# Patient Record
Sex: FEMALE | Race: BLACK OR AFRICAN AMERICAN | Employment: FULL TIME | URBAN - METROPOLITAN AREA
[De-identification: names, ages, dates, MRNs, and addresses within clinical notes are randomized per-mention and may not be internally consistent; named-entity substitution may affect disease eponyms.]

---

## 2018-02-20 LAB
HEPATITIS B SURFACE ANTIGEN INTERPRETATION: NEGATIVE
HIV-1 AND HIV-2 ANTIBODIES: NON REACTIVE
RPR: NEGATIVE
RUBELLA ANTIBODY IGG: NORMAL

## 2018-09-21 ENCOUNTER — HOSPITAL ENCOUNTER (INPATIENT)
Dept: POSTPARTUM | Age: 33
LOS: 2 days | Discharge: HOME | End: 2018-09-23
Attending: OBSTETRICS & GYNECOLOGY | Admitting: OBSTETRICS & GYNECOLOGY
Payer: COMMERCIAL

## 2018-09-21 PROBLEM — Z34.90 ENCOUNTER FOR SUPERVISION OF NORMAL PREGNANCY: Status: ACTIVE | Noted: 2018-09-21

## 2018-09-21 LAB
A/G RATIO: 1.3 (ref 1.1–2.2)
ABO/RH: NORMAL
ALBUMIN SERPL-MCNC: 3.9 G/DL (ref 3.4–5)
ALP BLD-CCNC: 148 U/L (ref 40–129)
ALT SERPL-CCNC: 7 U/L (ref 10–40)
AMPHETAMINE SCREEN, URINE: NORMAL
ANION GAP SERPL CALCULATED.3IONS-SCNC: 12 MMOL/L (ref 3–16)
ANTIBODY SCREEN: NORMAL
AST SERPL-CCNC: 17 U/L (ref 15–37)
BACTERIA: ABNORMAL /HPF
BARBITURATE SCREEN URINE: NORMAL
BENZODIAZEPINE SCREEN, URINE: NORMAL
BILIRUB SERPL-MCNC: <0.2 MG/DL (ref 0–1)
BILIRUBIN URINE: NEGATIVE
BLOOD, URINE: ABNORMAL
BUN BLDV-MCNC: 9 MG/DL (ref 7–20)
BUPRENORPHINE URINE: NORMAL
CALCIUM SERPL-MCNC: 9.5 MG/DL (ref 8.3–10.6)
CANNABINOID SCREEN URINE: NORMAL
CHLORIDE BLD-SCNC: 104 MMOL/L (ref 99–110)
CLARITY: CLEAR
CO2: 18 MMOL/L (ref 21–32)
COCAINE METABOLITE SCREEN URINE: NORMAL
COLOR: YELLOW
CREAT SERPL-MCNC: 0.6 MG/DL (ref 0.6–1.1)
CREATININE URINE: 100.6 MG/DL (ref 28–259)
EPITHELIAL CELLS, UA: 3 /HPF (ref 0–5)
GFR AFRICAN AMERICAN: >60
GFR NON-AFRICAN AMERICAN: >60
GLOBULIN: 3.1 G/DL
GLUCOSE BLD-MCNC: 70 MG/DL (ref 70–99)
GLUCOSE BLD-MCNC: 74 MG/DL (ref 70–99)
GLUCOSE URINE: NEGATIVE MG/DL
HCT VFR BLD CALC: 34.1 % (ref 36–48)
HEMOGLOBIN: 10.9 G/DL (ref 12–16)
HYALINE CASTS: 3 /LPF (ref 0–8)
KETONES, URINE: NEGATIVE MG/DL
LEUKOCYTE ESTERASE, URINE: NEGATIVE
Lab: NORMAL
MCH RBC QN AUTO: 27 PG (ref 26–34)
MCHC RBC AUTO-ENTMCNC: 32.1 G/DL (ref 31–36)
MCV RBC AUTO: 84.1 FL (ref 80–100)
METHADONE SCREEN, URINE: NORMAL
MICROSCOPIC EXAMINATION: YES
NITRITE, URINE: NEGATIVE
OPIATE SCREEN URINE: NORMAL
OXYCODONE URINE: NORMAL
PDW BLD-RTO: 14.9 % (ref 12.4–15.4)
PERFORMED ON: NORMAL
PH UA: 6
PH UA: 6
PHENCYCLIDINE SCREEN URINE: NORMAL
PLATELET # BLD: 169 K/UL (ref 135–450)
PMV BLD AUTO: 11.2 FL (ref 5–10.5)
POTASSIUM SERPL-SCNC: 3.9 MMOL/L (ref 3.5–5.1)
PROPOXYPHENE SCREEN: NORMAL
PROTEIN PROTEIN: 22 MG/DL
PROTEIN UA: NEGATIVE MG/DL
PROTEIN/CREAT RATIO: 0.2 MG/DL
RBC # BLD: 4.05 M/UL (ref 4–5.2)
RBC UA: 5 /HPF (ref 0–4)
SODIUM BLD-SCNC: 134 MMOL/L (ref 136–145)
SPECIFIC GRAVITY UA: 1.01
TOTAL PROTEIN: 7 G/DL (ref 6.4–8.2)
TOTAL SYPHILLIS IGG/IGM: NORMAL
URIC ACID, SERUM: 4.6 MG/DL (ref 2.6–6)
URINE TYPE: ABNORMAL
UROBILINOGEN, URINE: 0.2 E.U./DL
WBC # BLD: 6.7 K/UL (ref 4–11)
WBC UA: 4 /HPF (ref 0–5)

## 2018-09-21 PROCEDURE — 2500000003 HC RX 250 WO HCPCS

## 2018-09-21 PROCEDURE — 36415 COLL VENOUS BLD VENIPUNCTURE: CPT

## 2018-09-21 PROCEDURE — 85027 COMPLETE CBC AUTOMATED: CPT

## 2018-09-21 PROCEDURE — 82570 ASSAY OF URINE CREATININE: CPT

## 2018-09-21 PROCEDURE — 9990 CHARGE CONVERSION

## 2018-09-21 PROCEDURE — 80307 DRUG TEST PRSMV CHEM ANLYZR: CPT

## 2018-09-21 PROCEDURE — 86901 BLOOD TYPING SEROLOGIC RH(D): CPT

## 2018-09-21 PROCEDURE — 86850 RBC ANTIBODY SCREEN: CPT

## 2018-09-21 PROCEDURE — 84156 ASSAY OF PROTEIN URINE: CPT

## 2018-09-21 PROCEDURE — 84550 ASSAY OF BLOOD/URIC ACID: CPT

## 2018-09-21 PROCEDURE — 59025 FETAL NON-STRESS TEST: CPT

## 2018-09-21 PROCEDURE — 86900 BLOOD TYPING SEROLOGIC ABO: CPT

## 2018-09-21 PROCEDURE — 80053 COMPREHEN METABOLIC PANEL: CPT

## 2018-09-21 PROCEDURE — 86780 TREPONEMA PALLIDUM: CPT

## 2018-09-21 PROCEDURE — 81001 URINALYSIS AUTO W/SCOPE: CPT

## 2018-09-21 RX ORDER — SODIUM CHLORIDE 0.9 % (FLUSH) 0.9 %
10 SYRINGE (ML) INJECTION PRN
Status: DISCONTINUED | OUTPATIENT
Start: 2018-09-21 | End: 2018-09-21

## 2018-09-21 RX ORDER — ACETAMINOPHEN 500 MG
1000 TABLET ORAL EVERY 6 HOURS PRN
Status: DISCONTINUED | OUTPATIENT
Start: 2018-09-21 | End: 2018-09-23 | Stop reason: HOSPADM

## 2018-09-21 RX ORDER — NALBUPHINE HCL 10 MG/ML
5 AMPUL (ML) INJECTION EVERY 4 HOURS PRN
Status: DISCONTINUED | OUTPATIENT
Start: 2018-09-21 | End: 2018-09-21

## 2018-09-21 RX ORDER — SODIUM CHLORIDE 0.9 % (FLUSH) 0.9 %
10 SYRINGE (ML) INJECTION PRN
Status: DISCONTINUED | OUTPATIENT
Start: 2018-09-21 | End: 2018-09-23 | Stop reason: HOSPADM

## 2018-09-21 RX ORDER — NALOXONE HYDROCHLORIDE 0.4 MG/ML
0.4 INJECTION, SOLUTION INTRAMUSCULAR; INTRAVENOUS; SUBCUTANEOUS PRN
Status: DISCONTINUED | OUTPATIENT
Start: 2018-09-21 | End: 2018-09-21

## 2018-09-21 RX ORDER — IBUPROFEN 400 MG/1
800 TABLET ORAL EVERY 6 HOURS PRN
Status: DISCONTINUED | OUTPATIENT
Start: 2018-09-21 | End: 2018-09-21

## 2018-09-21 RX ORDER — SODIUM CHLORIDE, SODIUM LACTATE, POTASSIUM CHLORIDE, CALCIUM CHLORIDE 600; 310; 30; 20 MG/100ML; MG/100ML; MG/100ML; MG/100ML
INJECTION, SOLUTION INTRAVENOUS CONTINUOUS
Status: DISCONTINUED | OUTPATIENT
Start: 2018-09-21 | End: 2018-09-21

## 2018-09-21 RX ORDER — SODIUM CHLORIDE, SODIUM LACTATE, POTASSIUM CHLORIDE, CALCIUM CHLORIDE 600; 310; 30; 20 MG/100ML; MG/100ML; MG/100ML; MG/100ML
INJECTION, SOLUTION INTRAVENOUS
Status: COMPLETED
Start: 2018-09-21 | End: 2018-09-21

## 2018-09-21 RX ORDER — IBUPROFEN 400 MG/1
800 TABLET ORAL EVERY 6 HOURS PRN
Status: DISCONTINUED | OUTPATIENT
Start: 2018-09-21 | End: 2018-09-23 | Stop reason: HOSPADM

## 2018-09-21 RX ORDER — SODIUM CHLORIDE, SODIUM LACTATE, POTASSIUM CHLORIDE, CALCIUM CHLORIDE 600; 310; 30; 20 MG/100ML; MG/100ML; MG/100ML; MG/100ML
INJECTION, SOLUTION INTRAVENOUS CONTINUOUS
Status: DISCONTINUED | OUTPATIENT
Start: 2018-09-21 | End: 2018-09-23 | Stop reason: HOSPADM

## 2018-09-21 RX ORDER — SODIUM CHLORIDE 0.9 % (FLUSH) 0.9 %
10 SYRINGE (ML) INJECTION EVERY 12 HOURS SCHEDULED
Status: DISCONTINUED | OUTPATIENT
Start: 2018-09-21 | End: 2018-09-21

## 2018-09-21 RX ORDER — LIDOCAINE HYDROCHLORIDE 10 MG/ML
30 INJECTION, SOLUTION EPIDURAL; INFILTRATION; INTRACAUDAL; PERINEURAL PRN
Status: DISCONTINUED | OUTPATIENT
Start: 2018-09-21 | End: 2018-09-21

## 2018-09-21 RX ORDER — DOCUSATE SODIUM 100 MG/1
100 CAPSULE, LIQUID FILLED ORAL 2 TIMES DAILY
Status: DISCONTINUED | OUTPATIENT
Start: 2018-09-21 | End: 2018-09-23 | Stop reason: HOSPADM

## 2018-09-21 RX ORDER — DOCUSATE SODIUM 100 MG/1
100 CAPSULE, LIQUID FILLED ORAL 2 TIMES DAILY
Status: DISCONTINUED | OUTPATIENT
Start: 2018-09-21 | End: 2018-09-21 | Stop reason: SDUPTHER

## 2018-09-21 RX ORDER — TERBUTALINE SULFATE 1 MG/ML
0.25 INJECTION, SOLUTION SUBCUTANEOUS
Status: DISCONTINUED | OUTPATIENT
Start: 2018-09-21 | End: 2018-09-21

## 2018-09-21 RX ORDER — SODIUM CHLORIDE 0.9 % (FLUSH) 0.9 %
10 SYRINGE (ML) INJECTION PRN
Status: DISCONTINUED | OUTPATIENT
Start: 2018-09-21 | End: 2018-09-21 | Stop reason: SDUPTHER

## 2018-09-21 RX ORDER — SODIUM CHLORIDE 0.9 % (FLUSH) 0.9 %
10 SYRINGE (ML) INJECTION EVERY 12 HOURS SCHEDULED
Status: DISCONTINUED | OUTPATIENT
Start: 2018-09-21 | End: 2018-09-23 | Stop reason: HOSPADM

## 2018-09-21 RX ORDER — ONDANSETRON 2 MG/ML
4 INJECTION INTRAMUSCULAR; INTRAVENOUS EVERY 6 HOURS PRN
Status: DISCONTINUED | OUTPATIENT
Start: 2018-09-21 | End: 2018-09-21

## 2018-09-21 RX ORDER — FERROUS SULFATE TAB EC 324 MG (65 MG FE EQUIVALENT) 324 (65 FE) MG
324 TABLET DELAYED RESPONSE ORAL 2 TIMES DAILY WITH MEALS
Status: DISCONTINUED | OUTPATIENT
Start: 2018-09-22 | End: 2018-09-23 | Stop reason: HOSPADM

## 2018-09-21 RX ORDER — SODIUM CHLORIDE 0.9 % (FLUSH) 0.9 %
10 SYRINGE (ML) INJECTION EVERY 12 HOURS SCHEDULED
Status: DISCONTINUED | OUTPATIENT
Start: 2018-09-21 | End: 2018-09-21 | Stop reason: SDUPTHER

## 2018-09-21 RX ORDER — LANOLIN 100 %
OINTMENT (GRAM) TOPICAL PRN
Status: DISCONTINUED | OUTPATIENT
Start: 2018-09-21 | End: 2018-09-23 | Stop reason: HOSPADM

## 2018-09-21 RX ORDER — LANOLIN 100 %
OINTMENT (GRAM) TOPICAL PRN
Status: DISCONTINUED | OUTPATIENT
Start: 2018-09-21 | End: 2018-09-21 | Stop reason: SDUPTHER

## 2018-09-21 RX ORDER — ACETAMINOPHEN 500 MG
1000 TABLET ORAL EVERY 6 HOURS PRN
Status: DISCONTINUED | OUTPATIENT
Start: 2018-09-21 | End: 2018-09-21

## 2018-09-21 RX ADMIN — Medication 1 MILLI-UNITS/MIN: at 12:01

## 2018-09-21 RX ADMIN — SODIUM CHLORIDE, SODIUM LACTATE, POTASSIUM CHLORIDE, CALCIUM CHLORIDE: 600; 310; 30; 20 INJECTION, SOLUTION INTRAVENOUS at 10:40

## 2018-09-21 RX ADMIN — SODIUM CHLORIDE, SODIUM LACTATE, POTASSIUM CHLORIDE, CALCIUM CHLORIDE: 600; 310; 30; 20 INJECTION, SOLUTION INTRAVENOUS at 13:10

## 2018-09-21 RX ADMIN — IBUPROFEN 800 MG: 400 TABLET ORAL at 19:30

## 2018-09-21 RX ADMIN — DOCUSATE SODIUM 100 MG: 100 CAPSULE, LIQUID FILLED ORAL at 20:33

## 2018-09-21 ASSESSMENT — PAIN SCALES - GENERAL: PAINLEVEL_OUTOF10: 5

## 2018-09-21 NOTE — FLOWSHEET NOTE
Dr Arlys Najjar in to bedside for spec exam. Pooling noted - clear fluid. SVE 3/40/-3. Dr Mireya Ambriz called and Dr Nandini Olsen answered. MD informed of pt arrival G/P, GA and labor status. Informed of minimal variability with VD and contractions approx every 4 min, minimal to palpation. Orders for admission received, pt may have epi when desires, recheck cervix in a few hours and call back with update around noon.

## 2018-09-21 NOTE — FLOWSHEET NOTE
Dr Joss Sims notified that patient is complete and feeling the urge to push. States that she will leave the office now and come over for delivery.

## 2018-09-21 NOTE — FLOWSHEET NOTE
Dr Aries Jean called and asked about patient status. Gave her an update and stated to start pitocin and she would be over shortly to check cervix for change.

## 2018-09-21 NOTE — FLOWSHEET NOTE
Patient able to ambulate to bathroom. Latoya care educated and performed. Epidural cath removed with tip intact, gown changed. Patient to be transferred to post partum.

## 2018-09-21 NOTE — PROCEDURES
Procedures    [facility] Department of Anesthesiology  Procedure Note - Continuous Labor Epidural       Name:  Cat Freire                                         Age:  34 y.o. MRN:  [id number]     Attending Obstetrician:  Hebert Wilkerson MD  Procedure: Labor Epidural [92336]     Diagnosis: Vaginal Delivery [650]    Procedure Start Time:  0266   Procedure End Time:  2724    Allergies:  Patient has no known allergies. H&P Status: H&P was reviewed, the patient was examined and no change has occurred in patient's condition since H&P was completed. Diagnostic Data Review:  PT/INR  No results found for: PTINR  PT/INR Warfarin:  No components found for: PTPATWAR, PTINRWAR  PTT: No results found for: APTT  PTT Heparin: No components found for: APTTHEP  CBC:   Lab Results   Component Value Date    WBC 6.7 09/21/2018    RBC 4.05 09/21/2018    HGB 10.9 09/21/2018    HCT 34.1 09/21/2018    MCV 84.1 09/21/2018    RDW 14.9 09/21/2018     09/21/2018       Consent:  Risks and benefits of the labor epidural were discussed and the patient was given the opportunity to ask questions. Informed consent was obtained. Procedure:  Position: Sitting. Sterile technique: Hat,mask,gloves. Skin Prep/Drape: Betadine. Skin Local: 3ml 1% lidocaine  Interspace: L3-L4   Catheter Distances:  Skin to epidural space 6cm. Catheter 12cm at skin. Aspiration for CSF/Blood: Negative    Paresthesia: Negative    Test dose: Negative (3ml 1. 5%Lidocaine with 1:200,000 Epinephrine)    Difficulties/Complications: None    Epidural Medication:  Bolus Dose:   10cc . 25% Marcaine  Infusion Dose:  10cc/o .125% Marcaine with Fentanyl    LUDMILA Wolf - CRNA  2:46 PM

## 2018-09-21 NOTE — ANESTHESIA PRE-OP
Department of Anesthesiology  Preprocedure Note       Name:  Krish Horn   Age:  34 y.o.  :  1985                                          MRN:  [id number]         Date:  2018      Surgeon:    Procedure:    Medications prior to admission:   Prior to Admission medications    Medication Sig Start Date End Date Taking? Authorizing Provider   Prenatal MV-Min-Fe Fum-FA-DHA (PRENATAL 1 PO) Take 1 tablet by mouth daily   Yes Historical Provider, MD   RaNITidine HCl (ZANTAC 75 PO) Take 75 mg by mouth as needed   Yes Historical Provider, MD       Current medications:    Current Facility-Administered Medications   Medication Dose Route Frequency Provider Last Rate Last Dose    lactated ringers infusion   Intravenous Continuous Mukul Gudino  mL/hr at 18 1040      sodium chloride flush 0.9 % injection 10 mL  10 mL Intravenous 2 times per day Mukul Gudino MD        sodium chloride flush 0.9 % injection 10 mL  10 mL Intravenous PRN Mukul Gudino MD        lidocaine PF 1 % injection 30 mL  30 mL Other PRN Mukul Gudino MD        ondansetron [facility]) injection 4 mg  4 mg Intravenous Q6H PRN Mukul Gudino MD        terbutaline (BRETHINE) injection 0.25 mg  0.25 mg Subcutaneous Once PRN Mukul Gudino MD           Allergies:  No Known Allergies    Problem List:  There is no problem list on file for this patient.       Past Medical History:        Diagnosis Date    Gestational diabetes        Past Surgical History:        Procedure Laterality Date    WISDOM TOOTH EXTRACTION         Social History:    Social History   Substance Use Topics    Smoking status: Never Smoker    Smokeless tobacco: Never Used    Alcohol use No                                Counseling given: Not Answered      Vital Signs (Current):   Vitals:    18 0921 18 0952   BP:  134/75   Pulse:  68   Resp: 20    Temp: 98.4 °F (36.9 °C)    TempSrc: Oral    Weight: 208 lb (94.3 kg)    Height: 5' 4\" (1.626 m) BP Readings from Last 3 Encounters:   09/21/18 134/75       NPO Status: Time of last liquid consumption: 0800                        Time of last solid consumption: 0001                        Date of last liquid consumption: 09/21/18                        Date of last solid food consumption: 09/21/18    BMI:   Wt Readings from Last 3 Encounters:   09/21/18 208 lb (94.3 kg)     Body mass index is 35.7 kg/m². Anesthesia Evaluation  Patient summary reviewed  Airway: Mallampati: II  TM distance: >3 FB   Neck ROM: full  Mouth opening: > = 3 FB Dental: normal exam         Pulmonary:Negative Pulmonary ROS and normal exam                               Cardiovascular:Negative CV ROS                      Neuro/Psych:   Negative Neuro/Psych ROS              GI/Hepatic/Renal: Neg GI/Hepatic/Renal ROS            Endo/Other:    (+) DiabetesType II DM, well controlled, , .                 Abdominal:           Vascular: negative vascular ROS. Anesthesia Plan      epidural     ASA 2             Anesthetic plan and risks discussed with patient. Use of blood products discussed with patient whom consented to blood products. [facility] Department of Anesthesiology  Pre-Anesthesia Evaluation/Consultation       Name:  Jacinda Medeiros                                         Age:  34 y.o. MRN:  [id number]           Procedure (Scheduled):  Labor Epidural  Surgeon:  Dr. Tanna Ugalde     No Known Allergies  There is no problem list on file for this patient. Past Medical History:   Diagnosis Date    Gestational diabetes      Past Surgical History:   Procedure Laterality Date    WISDOM TOOTH EXTRACTION       Social History   Substance Use Topics    Smoking status: Never Smoker    Smokeless tobacco: Never Used    Alcohol use No     Medications  No current facility-administered medications on file prior to encounter.       No current outpatient found for: PROTIME, INR, APTT  HCG (If Applicable) No results found for: PREGTESTUR, PREGSERUM, HCG, HCGQUANT   ABGs No results found for: PHART, PO2ART, XYG3JXM, PXV9TRX, BEART, [id number]   Type & Screen (If Applicable)  No results found for: Delfin Grijalva, [address] [place] [address], APRN - CRNA   9/21/2018

## 2018-09-21 NOTE — ANESTHESIA POST-OP
Anesthesia Post-op Note    Patient: Namita Mack  MRN: [id number]  YOB: 1985  Date of evaluation: 9/21/2018  Time:  5:38 PM     Procedure(s) Performed:     Last Vitals: BP (!) 142/66   Pulse 68   Temp 98 °F (36.7 °C) (Oral)   Resp 18   Ht 5' 4\" (1.626 m)   Wt 208 lb (94.3 kg)   SpO2 99%   Breastfeeding?  Unknown   BMI 35.70 kg/m²     Skip Phase I:      Skip Phase II:      Anesthesia Post Evaluation    Final anesthesia type: epidural  Patient location during evaluation: floor  Patient participation: complete - patient participated  Level of consciousness: awake and alert  Pain score: 0  Nausea & Vomiting: no nausea and no vomiting  Complications: no  Cardiovascular status: hemodynamically stable  Respiratory status: acceptable  Hydration status: stable        [address], APRN - CRNA  5:38 PM

## 2018-09-22 LAB
HCT VFR BLD CALC: 29.9 % (ref 36–48)
HEMOGLOBIN: 9.7 G/DL (ref 12–16)
MCH RBC QN AUTO: 27.4 PG (ref 26–34)
MCHC RBC AUTO-ENTMCNC: 32.4 G/DL (ref 31–36)
MCV RBC AUTO: 84.6 FL (ref 80–100)
PDW BLD-RTO: 15.5 % (ref 12.4–15.4)
PLATELET # BLD: 146 K/UL (ref 135–450)
PMV BLD AUTO: 10.7 FL (ref 5–10.5)
RBC # BLD: 3.53 M/UL (ref 4–5.2)
WBC # BLD: 10.2 K/UL (ref 4–11)

## 2018-09-22 PROCEDURE — 1200000000 HC SEMI PRIVATE

## 2018-09-22 PROCEDURE — 6370000000 HC RX 637 (ALT 250 FOR IP): Performed by: OBSTETRICS & GYNECOLOGY

## 2018-09-22 PROCEDURE — 85027 COMPLETE CBC AUTOMATED: CPT

## 2018-09-22 RX ADMIN — IBUPROFEN 800 MG: 400 TABLET ORAL at 01:45

## 2018-09-22 RX ADMIN — DOCUSATE SODIUM 100 MG: 100 CAPSULE, LIQUID FILLED ORAL at 21:38

## 2018-09-22 RX ADMIN — IBUPROFEN 800 MG: 400 TABLET ORAL at 09:07

## 2018-09-22 RX ADMIN — FERROUS SULFATE TAB EC 324 MG (65 MG FE EQUIVALENT) 324 MG: 324 (65 FE) TABLET DELAYED RESPONSE at 16:47

## 2018-09-22 RX ADMIN — IBUPROFEN 800 MG: 400 TABLET ORAL at 16:47

## 2018-09-22 RX ADMIN — ACETAMINOPHEN 1000 MG: 500 TABLET ORAL at 21:45

## 2018-09-22 RX ADMIN — DOCUSATE SODIUM 100 MG: 100 CAPSULE, LIQUID FILLED ORAL at 09:07

## 2018-09-22 ASSESSMENT — PAIN SCALES - GENERAL
PAINLEVEL_OUTOF10: 4
PAINLEVEL_OUTOF10: 4
PAINLEVEL_OUTOF10: 5
PAINLEVEL_OUTOF10: 4

## 2018-09-22 NOTE — FLOWSHEET NOTE
Awake in room, in bed at present with side rails up x 2 and call light in reach, bed in low position. FOB also at bedside. Currently rates pain at a two at rest. Use of call light and infant safety reviewed. Verbalized understanding. White board updated. Plan of care discussed. Verbalized understanding of use of call light and infant safety. No needs at present.

## 2018-09-22 NOTE — PAYOR INFORMATION
VAG-DEL        ANTHEM 48/HRS/2D 09/21/18-09/23/18-NPR**STAY PAST LOS CALL [facility]ERTO [facility] BRZ:[phone]   [facility]--48HRS/2D -NPR

## 2018-09-22 NOTE — FLOWSHEET NOTE
RN at bedside introducing self to pt. Pt sitting in bed resting, holding infant. Pt denies needs at this time. Pt explains that she plans on nursing infant soon. Call light within reach.

## 2018-09-22 NOTE — LACTATION NOTE
This note was copied from a baby's chart. LC to room. Mother sleeping at this time. FOB holding swaddled sleeping infant. [facility] wrote name and circled number on white board and encouraged FOB to have mother call when awake. FOB agreed and denies any further needs at this time.
for next feeding.

## 2018-09-23 VITALS
OXYGEN SATURATION: 99 % | BODY MASS INDEX: 35.51 KG/M2 | WEIGHT: 208 LBS | DIASTOLIC BLOOD PRESSURE: 58 MMHG | HEART RATE: 86 BPM | RESPIRATION RATE: 16 BRPM | TEMPERATURE: 98.5 F | HEIGHT: 64 IN | SYSTOLIC BLOOD PRESSURE: 108 MMHG

## 2018-09-23 PROCEDURE — 6370000000 HC RX 637 (ALT 250 FOR IP): Performed by: OBSTETRICS & GYNECOLOGY

## 2018-09-23 RX ADMIN — IBUPROFEN 800 MG: 400 TABLET ORAL at 09:19

## 2018-09-23 RX ADMIN — FERROUS SULFATE TAB EC 324 MG (65 MG FE EQUIVALENT) 324 MG: 324 (65 FE) TABLET DELAYED RESPONSE at 09:19

## 2018-09-23 RX ADMIN — DOCUSATE SODIUM 100 MG: 100 CAPSULE, LIQUID FILLED ORAL at 09:19

## 2018-09-23 RX ADMIN — IBUPROFEN 800 MG: 400 TABLET ORAL at 00:57

## 2018-09-23 ASSESSMENT — PAIN SCALES - GENERAL
PAINLEVEL_OUTOF10: 4
PAINLEVEL_OUTOF10: 2
PAINLEVEL_OUTOF10: 0
PAINLEVEL_OUTOF10: 0

## 2024-08-12 ENCOUNTER — OFFICE VISIT (OUTPATIENT)
Age: 39
End: 2024-08-12

## 2024-08-12 VITALS
WEIGHT: 213.6 LBS | DIASTOLIC BLOOD PRESSURE: 84 MMHG | BODY MASS INDEX: 36.66 KG/M2 | SYSTOLIC BLOOD PRESSURE: 128 MMHG | RESPIRATION RATE: 18 BRPM | TEMPERATURE: 98.3 F | HEART RATE: 78 BPM | OXYGEN SATURATION: 98 %

## 2024-08-12 DIAGNOSIS — S89.91XA INJURY OF RIGHT SHIN, INITIAL ENCOUNTER: Primary | ICD-10-CM

## 2024-08-12 RX ORDER — CYCLOBENZAPRINE HCL 5 MG
5 TABLET ORAL 2 TIMES DAILY PRN
Qty: 10 TABLET | Refills: 0 | Status: SHIPPED | OUTPATIENT
Start: 2024-08-12 | End: 2024-08-22

## 2024-08-12 RX ORDER — METHYLPREDNISOLONE 4 MG/1
TABLET ORAL
Qty: 21 KIT | Refills: 0 | Status: SHIPPED | OUTPATIENT
Start: 2024-08-12 | End: 2024-08-18

## 2024-08-12 NOTE — PROGRESS NOTES
Juana Lipscomb (:  1985) is a 38 y.o. female,New patient, here for evaluation of the following chief complaint(s):  Leg Pain (Pt c/o right leg pain that is located on her inner shin. Pt states that she fell about a week ago and is now experiencing pain with standing, walking, and flexion of the foot. Pt also reports pain with deep touch. )      ASSESSMENT/PLAN:    ICD-10-CM    1. Injury of right shin, initial encounter  S89.91XA methylPREDNISolone (MEDROL DOSEPACK) 4 MG tablet     cyclobenzaprine (FLEXERIL) 5 MG tablet        Patient is experiencing a right shin injury after falling almost two weeks ago. There is a palpable mass along the medial side of her right tibia, possibly inflammation. She was started on a steroid pack at this time and an ACE wrap was placed. Encouraged her to take the steroid as prescribed, alternate tylenol and ibuprofen, alternate heat and ice, leave ACE wrap in place while awake, and use muscle relaxer as needed. If pain persists past the steroid pack, return or we may need to refer to ortho for further workup. Patient is understanding and agreeable to this plan.     Dx Disposition: calf inflammation, shin splint, stress fracture  Education and handout provided on diagnosis and management of symptoms.   AVS reviewed with patient. Follow up as needed in UC or with PCP for new or worsening symptoms.   Return if symptoms worsen or fail to improve.    SUBJECTIVE/OBJECTIVE:  Patient presents to the clinic with complaints of right shin area pain. This started about 6 days ago, after she fell down steps in the rain like 10 days ago. Pain has gotten better, but is worse with standing for long periods of time, flexion of foot, and walking. She has tried rest, elevate, ice, and ibuprofen with little relief.        History provided by:  Patient   used: No    Leg Pain         Vitals:    24 1156 24 1218   BP: (!) 143/85 128/84   Site: Right Upper Arm    Position: